# Patient Record
(demographics unavailable — no encounter records)

---

## 2024-11-13 NOTE — DISCUSSION/SUMMARY
[FreeTextEntry1] : I personally took the patient's history, performed the patient's physical examination, did the appropriate imaging, assessed the problems at hand, came up with the plan, and appropriately implemented the plan.

## 2024-11-13 NOTE — PHYSICAL EXAM
[Chaperone Present] : A chaperone was present in the examining room during all aspects of the physical examination [[id number]] : A chaperone was present during the pelvic exam. [Appropriately responsive] : appropriately responsive [Alert] : alert [No Acute Distress] : no acute distress [Soft] : soft [Non-tender] : non-tender [Non-distended] : non-distended [Oriented x3] : oriented x3 [Examination Of The Breasts] : a normal appearance [No Discharge] : no discharge [No Masses] : no breast masses were palpable [Labia Majora] : normal [Labia Minora] : normal [Normal] : normal [Uterine Adnexae] : normal [FreeTextEntry2] : JENNIFER Contreras

## 2024-11-13 NOTE — HISTORY OF PRESENT ILLNESS
[LMP unknown] : LMP unknown [postmenopausal] : postmenopausal [unknown] : Patient is unsure of the date of her LMP [Currently In Menopause] : currently in menopause [Post-Menopause, No Sxs] : post-menopausal, currently without symptoms [Menopause Age: ____] : age at menopause was [unfilled] [No] : Patient does not have concerns regarding sex [FreeTextEntry1] : Nhung is a 86 y/o here for a follow up visit. She is c/o occasional incontinence needing to wear pads and depends. She is s/p colpocleisis since May 2023.

## 2024-11-13 NOTE — PHYSICAL EXAM
[Chaperone Present] : A chaperone was present in the examining room during all aspects of the physical examination [[id number]] : A chaperone was present during the pelvic exam. [Appropriately responsive] : appropriately responsive [Alert] : alert [No Acute Distress] : no acute distress [Soft] : soft [Non-tender] : non-tender [Non-distended] : non-distended [Oriented x3] : oriented x3 [Examination Of The Breasts] : a normal appearance [No Discharge] : no discharge [No Masses] : no breast masses were palpable [Labia Majora] : normal [Labia Minora] : normal [Normal] : normal [Uterine Adnexae] : normal [FreeTextEntry2] : JENNIFER Contreras

## 2024-11-13 NOTE — PLAN
[FreeTextEntry1] : The patient's history and presentation were reviewed and discussed with Dr. Rosario. An assessment was conducted in collaboration with Dr. Rosario, and the plan of care was formulated and discussed accordingly.

## 2024-11-13 NOTE — HISTORY OF PRESENT ILLNESS
[LMP unknown] : LMP unknown [postmenopausal] : postmenopausal [unknown] : Patient is unsure of the date of her LMP [Currently In Menopause] : currently in menopause [Post-Menopause, No Sxs] : post-menopausal, currently without symptoms [Menopause Age: ____] : age at menopause was [unfilled] [No] : Patient does not have concerns regarding sex [FreeTextEntry1] : Nhung is a 84 y/o here for a follow up visit. She is c/o occasional incontinence needing to wear pads and depends. She is s/p colpocleisis since May 2023.

## 2025-06-02 NOTE — PHYSICAL EXAM
[Chaperoned Physical Exam] : A chaperone was present in the examining room during all aspects of the physical examination. [MA] : MA [FreeTextEntry2] : Mary Kay [FreeTextEntry1] :  Indication: UU/UUI/Enuresis Urethra was prepped in sterile fashion and then a sterile non indwelling catheter ([id number]) was used by me to drain the bladder.  The patient tolerated the procedure well.  Void: 60 cc PVR: 10 cc [No Acute Distress] : in no acute distress [Well developed] : well developed [Well Nourished] : ~L well nourished

## 2025-06-02 NOTE — HISTORY OF PRESENT ILLNESS
[FreeTextEntry1] : Today patient presents for follow-up visit. She has history of urinary urgency/urge incontinence She was last seen on  for med check.  Patient is s/p Lefort Colpocleisis in 2023 at [place], Dr. Joselo Rodríguez  From initial visit: 2022  83 year para 2 ( x2) presents with complaints of frequent urination for the past year.  Pelvic organ prolapse: + bulge, + pressure/heaviness, duration 1 year Stress urinary incontinence: 6 x/week no prior incontinence procedures Overactive bladder syndrome: daily frequency 8 x/day, 3 x/night, + urgency, 21 x/week UUI episodes, 4-5 pads/day Bladder irritants include coffee, tea, Prior OAB meds no Voiding dysfunction: no Incomplete bladder emptying, no hesitancy Lower urinary tract/vaginal symptoms: no UTIs per year, no hematuria, no dysuria, no bladder pain 14 BM/week no constipation Fecal incontinence no Sexually active no Pelvic pain no Vaginal dryness no LMP age 55 PMB no  GH: 4 PB: 3 TVL: 8 C: +3 D: +3 Aa: -2 Ba: +1 Ap: +2 Bp: +2 PVR 20 cc without reduction  Myrbetriq 25 mg - never started due to cost   Today, patient is doing well. Pt states she is still experiencing urgency with incontinence. She also states that she wakes up in the morning with her diaper wet.

## 2025-06-02 NOTE — END OF VISIT
[TextEntry] : I, Rika Ramos, ALISSA, spent 30 minutes face to face with the patient. This excludes cath

## 2025-06-02 NOTE — COUNSELING
[FreeTextEntry1] :  If you feel like you have an infection it is important for you to call our office and we will arrange testing of your urine.   Please begin taking Gemtesa 75 mg for urinary symptoms. It takes up to 6 weeks to go into full effect.  Please call my office if you have any issues with the cost or side effects of the medication.   Schedule 6 weeks follow up med check appointment with ALISSA Meléndez, or sooner if you have any issues.

## 2025-06-02 NOTE — DISCUSSION/SUMMARY
[FreeTextEntry1] : UU/UUI/Enuresis: -UA/UC ordered to rule out UTI. -Bladder diet and fluid modifications discussed. -Rx Gemtesa sent to the pharmacy, r/b discussed.  All questions addressed. RTC in 6 weeks for med check and PVR check, or sooner PRN

## 2025-06-02 NOTE — HISTORY OF PRESENT ILLNESS
[FreeTextEntry1] : Today patient presents for follow-up visit. She has history of urinary urgency/urge incontinence She was last seen on  for med check.  Patient is s/p Lefort Colpocleisis in 2023 at [place], Dr. Joselo Rodríguez  From initial visit: 2022  83 year para 2 ( x2) presents with complaints of frequent urination for the past year.  Pelvic organ prolapse: + bulge, + pressure/heaviness, duration 1 year Stress urinary incontinence: 6 x/week no prior incontinence procedures Overactive bladder syndrome: daily frequency 8 x/day, 3 x/night, + urgency, 21 x/week UUI episodes, 4-5 pads/day Bladder irritants include coffee, tea, Prior OAB meds no Voiding dysfunction: no Incomplete bladder emptying, no hesitancy Lower urinary tract/vaginal symptoms: no UTIs per year, no hematuria, no dysuria, no bladder pain 14 BM/week no constipation Fecal incontinence no Sexually active no Pelvic pain no Vaginal dryness no LMP age 55 PMB no  GH: 4 PB: 3 TVL: 8 C: +3 D: +3 Aa: -2 Ba: +1 Ap: +2 Bp: +2 PVR 20 cc without reduction  Myrbetriq 25 mg - never started due to cost   Today, patient is doing well. Pt states she is still experiencing urgency with incontinence. She also states that she wakes up in the morning with her diaper wet.

## 2025-06-02 NOTE — REASON FOR VISIT
[TextEntry] : Reason for visit: Follow Up Voids per day: 5   Voids per night: 0, wears pull ups   Urge incontinence: Yes Stress incontinence: No Constipation: No Fecal incontinence: No Vaginal bulge: No

## 2025-07-16 NOTE — REASON FOR VISIT
[TextEntry] : Reason for visit: Medication Follow Up Voids per day: 5   Voids per night: 0, wears pull ups  Urge incontinence: Yes Stress incontinence: No Constipation: No Fecal incontinence: No Vaginal bulge: No

## 2025-07-16 NOTE — DISCUSSION/SUMMARY
[FreeTextEntry1] : UU/UUI/Enuresis: Patient is taking Gemtesa and is happy with it and would like to continue. Bladder diet and fluid modification techniques re-iterated. Refills for Gemtesa sent. Precautions given.  All questions addressed. RTC in 6 mon for follow-up med check visit, or sooner PRN

## 2025-07-16 NOTE — HISTORY OF PRESENT ILLNESS
[FreeTextEntry1] : Patient presents for 6 weeks med check follow-up visit. She has history of urinary urgency/urge incontinence She was last seen on 2025 for follow-up visit.  Patient is s/p Lefort Colpocleisis in 2023 at [place], Dr. Joselo Rodríguez  From initial visit: 2022  83 year para 2 ( x2) presents with complaints of frequent urination for the past year.  Pelvic organ prolapse: + bulge, + pressure/heaviness, duration 1 year Stress urinary incontinence: 6 x/week no prior incontinence procedures Overactive bladder syndrome: daily frequency 8 x/day, 3 x/night, + urgency, 21 x/week UUI episodes, 4-5 pads/day Bladder irritants include coffee, tea, Prior OAB meds no Voiding dysfunction: no Incomplete bladder emptying, no hesitancy Lower urinary tract/vaginal symptoms: no UTIs per year, no hematuria, no dysuria, no bladder pain 14 BM/week no constipation Fecal incontinence no Sexually active no Pelvic pain no Vaginal dryness no LMP age 55 PMB no  GH: 4 PB: 3 TVL: 8 C: +3 D: +3 Aa: -2 Ba: +1 Ap: +2 Bp: +2 PVR 20 cc without reduction  Myrbetriq 25 mg - never started due to cost  Gemtesa   Today, patient is doing well and has no complains. She is taking Gemtesa with 50% improvement and denies any side effects. She only took gemtesa for 1 month because she was not aware that she had refills in the pharmacy. She denies any issues at this time.

## 2025-07-16 NOTE — COUNSELING
[FreeTextEntry1] :  If you feel like you have an infection it is important for you to call our office and we will arrange testing of your urine.   Please continue taking Gemtesa for frequency. Refills sent to your pharmacy.   Please call my office if you have any issues with the cost or side effects of the medication.  Please decrease or stop the use of the followin. Coffee (Caffeinated and decaffeinated)  2. Teas (Caffeinated, decaffeinated, Iced tea, and green teas  3. All sodas (Caffeinated, decaffeinated, energy drinks) 4. All carbonated drinks including seltzer water 5. All citric fruit juices  6. Water with lemon or lime  7. Spicy foods  8. Tomato Sauce based foods  9. Chocolate and chocolate containing products  10. All alcohol  For 4-5 days, cut one item from the list out of your diet.   After 4-5 days, if it makes a difference, you have to decide if you want to keep it in or out of your diet.  If it does not make a difference, you may add it back into your diet and remove another item for another 4-5 days.  Avoid liquid intake 2 hours before bedtime. It is recommended that you take sips of water to take your medications before bedtime.   Elevate your legs throughout the day, particularly 2 hours before bedtime.   We recommend wearing compression stockings during the day to help decrease the night time urination.  Schedule a 6 month follow up med check appointment with ALISSA Meléndez, or sooner if you have any issues.

## 2025-07-16 NOTE — HISTORY OF PRESENT ILLNESS
[FreeTextEntry1] : Patient presents for 6 weeks med check follow-up visit. She has history of urinary urgency/urge incontinence She was last seen on 2025 for follow-up visit.  Patient is s/p Lefort Colpocleisis in 2023 at [place], Dr. Joselo Rodríguez  From initial visit: 2022  83 year para 2 ( x2) presents with complaints of frequent urination for the past year.  Pelvic organ prolapse: + bulge, + pressure/heaviness, duration 1 year Stress urinary incontinence: 6 x/week no prior incontinence procedures Overactive bladder syndrome: daily frequency 8 x/day, 3 x/night, + urgency, 21 x/week UUI episodes, 4-5 pads/day Bladder irritants include coffee, tea, Prior OAB meds no Voiding dysfunction: no Incomplete bladder emptying, no hesitancy Lower urinary tract/vaginal symptoms: no UTIs per year, no hematuria, no dysuria, no bladder pain 14 BM/week no constipation Fecal incontinence no Sexually active no Pelvic pain no Vaginal dryness no LMP age 55 PMB no  GH: 4 PB: 3 TVL: 8 C: +3 D: +3 Aa: -2 Ba: +1 Ap: +2 Bp: +2 PVR 20 cc without reduction  Myrbetriq 25 mg - never started due to cost  Gemtesa   Today, patient is doing well and has no complains. She is taking Gemtesa with 50% improvement and denies any side effects. She only took gemtesa for 1 month because she was not aware that she had refills in the pharmacy. She denies any issues at this time.